# Patient Record
Sex: FEMALE | Race: OTHER | HISPANIC OR LATINO | URBAN - METROPOLITAN AREA
[De-identification: names, ages, dates, MRNs, and addresses within clinical notes are randomized per-mention and may not be internally consistent; named-entity substitution may affect disease eponyms.]

---
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
## 2021-10-20 PROBLEM — Z00.00 ENCOUNTER FOR PREVENTIVE HEALTH EXAMINATION: Status: ACTIVE | Noted: 2021-10-20

## 2023-03-24 ENCOUNTER — EMERGENCY (EMERGENCY)
Facility: HOSPITAL | Age: 23
LOS: 1 days | Discharge: HOME | End: 2023-03-24
Admitting: STUDENT IN AN ORGANIZED HEALTH CARE EDUCATION/TRAINING PROGRAM
Payer: COMMERCIAL

## 2023-03-24 VITALS
HEART RATE: 87 BPM | TEMPERATURE: 98 F | SYSTOLIC BLOOD PRESSURE: 119 MMHG | RESPIRATION RATE: 20 BRPM | DIASTOLIC BLOOD PRESSURE: 81 MMHG | OXYGEN SATURATION: 100 %

## 2023-03-24 VITALS
HEART RATE: 75 BPM | OXYGEN SATURATION: 100 % | RESPIRATION RATE: 16 BRPM | DIASTOLIC BLOOD PRESSURE: 75 MMHG | SYSTOLIC BLOOD PRESSURE: 112 MMHG

## 2023-03-24 LAB
ALBUMIN SERPL ELPH-MCNC: 4.9 G/DL — SIGNIFICANT CHANGE UP (ref 3.3–5)
ALP SERPL-CCNC: 83 U/L — SIGNIFICANT CHANGE UP (ref 40–120)
ALT FLD-CCNC: 13 U/L — SIGNIFICANT CHANGE UP (ref 4–33)
ANION GAP SERPL CALC-SCNC: 11 MMOL/L — SIGNIFICANT CHANGE UP (ref 7–14)
AST SERPL-CCNC: 32 U/L — SIGNIFICANT CHANGE UP (ref 4–32)
BASOPHILS # BLD AUTO: 0.02 K/UL — SIGNIFICANT CHANGE UP (ref 0–0.2)
BASOPHILS NFR BLD AUTO: 0.3 % — SIGNIFICANT CHANGE UP (ref 0–2)
BILIRUB SERPL-MCNC: 0.5 MG/DL — SIGNIFICANT CHANGE UP (ref 0.2–1.2)
BUN SERPL-MCNC: 12 MG/DL — SIGNIFICANT CHANGE UP (ref 7–23)
CALCIUM SERPL-MCNC: 9.7 MG/DL — SIGNIFICANT CHANGE UP (ref 8.4–10.5)
CHLORIDE SERPL-SCNC: 105 MMOL/L — SIGNIFICANT CHANGE UP (ref 98–107)
CO2 SERPL-SCNC: 20 MMOL/L — LOW (ref 22–31)
CREAT SERPL-MCNC: 0.46 MG/DL — LOW (ref 0.5–1.3)
D DIMER BLD IA.RAPID-MCNC: <150 NG/ML DDU — SIGNIFICANT CHANGE UP
EGFR: 139 ML/MIN/1.73M2 — SIGNIFICANT CHANGE UP
EOSINOPHIL # BLD AUTO: 0.02 K/UL — SIGNIFICANT CHANGE UP (ref 0–0.5)
EOSINOPHIL NFR BLD AUTO: 0.3 % — SIGNIFICANT CHANGE UP (ref 0–6)
GLUCOSE SERPL-MCNC: 93 MG/DL — SIGNIFICANT CHANGE UP (ref 70–99)
HCT VFR BLD CALC: 40.2 % — SIGNIFICANT CHANGE UP (ref 34.5–45)
HGB BLD-MCNC: 13.5 G/DL — SIGNIFICANT CHANGE UP (ref 11.5–15.5)
IANC: 5.2 K/UL — SIGNIFICANT CHANGE UP (ref 1.8–7.4)
IMM GRANULOCYTES NFR BLD AUTO: 0.3 % — SIGNIFICANT CHANGE UP (ref 0–0.9)
LYMPHOCYTES # BLD AUTO: 1.77 K/UL — SIGNIFICANT CHANGE UP (ref 1–3.3)
LYMPHOCYTES # BLD AUTO: 24 % — SIGNIFICANT CHANGE UP (ref 13–44)
MCHC RBC-ENTMCNC: 28.6 PG — SIGNIFICANT CHANGE UP (ref 27–34)
MCHC RBC-ENTMCNC: 33.6 GM/DL — SIGNIFICANT CHANGE UP (ref 32–36)
MCV RBC AUTO: 85.2 FL — SIGNIFICANT CHANGE UP (ref 80–100)
MONOCYTES # BLD AUTO: 0.34 K/UL — SIGNIFICANT CHANGE UP (ref 0–0.9)
MONOCYTES NFR BLD AUTO: 4.6 % — SIGNIFICANT CHANGE UP (ref 2–14)
NEUTROPHILS # BLD AUTO: 5.2 K/UL — SIGNIFICANT CHANGE UP (ref 1.8–7.4)
NEUTROPHILS NFR BLD AUTO: 70.5 % — SIGNIFICANT CHANGE UP (ref 43–77)
NRBC # BLD: 0 /100 WBCS — SIGNIFICANT CHANGE UP (ref 0–0)
NRBC # FLD: 0 K/UL — SIGNIFICANT CHANGE UP (ref 0–0)
PLATELET # BLD AUTO: 182 K/UL — SIGNIFICANT CHANGE UP (ref 150–400)
POTASSIUM SERPL-MCNC: 5.1 MMOL/L — SIGNIFICANT CHANGE UP (ref 3.5–5.3)
POTASSIUM SERPL-SCNC: 5.1 MMOL/L — SIGNIFICANT CHANGE UP (ref 3.5–5.3)
PROT SERPL-MCNC: 7.6 G/DL — SIGNIFICANT CHANGE UP (ref 6–8.3)
RBC # BLD: 4.72 M/UL — SIGNIFICANT CHANGE UP (ref 3.8–5.2)
RBC # FLD: 12.2 % — SIGNIFICANT CHANGE UP (ref 10.3–14.5)
SODIUM SERPL-SCNC: 136 MMOL/L — SIGNIFICANT CHANGE UP (ref 135–145)
TROPONIN T, HIGH SENSITIVITY RESULT: <6 NG/L — SIGNIFICANT CHANGE UP
WBC # BLD: 7.37 K/UL — SIGNIFICANT CHANGE UP (ref 3.8–10.5)
WBC # FLD AUTO: 7.37 K/UL — SIGNIFICANT CHANGE UP (ref 3.8–10.5)

## 2023-03-24 PROCEDURE — 71046 X-RAY EXAM CHEST 2 VIEWS: CPT | Mod: 26

## 2023-03-24 PROCEDURE — 99285 EMERGENCY DEPT VISIT HI MDM: CPT

## 2023-03-24 RX ORDER — KETOROLAC TROMETHAMINE 30 MG/ML
30 SYRINGE (ML) INJECTION ONCE
Refills: 0 | Status: DISCONTINUED | OUTPATIENT
Start: 2023-03-24 | End: 2023-03-24

## 2023-03-24 RX ORDER — LIDOCAINE 4 G/100G
1 CREAM TOPICAL ONCE
Refills: 0 | Status: COMPLETED | OUTPATIENT
Start: 2023-03-24 | End: 2023-03-24

## 2023-03-24 RX ORDER — CYCLOBENZAPRINE HYDROCHLORIDE 10 MG/1
1 TABLET, FILM COATED ORAL
Qty: 9 | Refills: 0
Start: 2023-03-24 | End: 2023-03-26

## 2023-03-24 RX ORDER — CYCLOBENZAPRINE HYDROCHLORIDE 10 MG/1
5 TABLET, FILM COATED ORAL ONCE
Refills: 0 | Status: COMPLETED | OUTPATIENT
Start: 2023-03-24 | End: 2023-03-24

## 2023-03-24 RX ORDER — ACETAMINOPHEN 500 MG
650 TABLET ORAL ONCE
Refills: 0 | Status: COMPLETED | OUTPATIENT
Start: 2023-03-24 | End: 2023-03-24

## 2023-03-24 RX ADMIN — Medication 650 MILLIGRAM(S): at 13:30

## 2023-03-24 RX ADMIN — Medication 30 MILLIGRAM(S): at 12:29

## 2023-03-24 RX ADMIN — Medication 30 MILLIGRAM(S): at 12:45

## 2023-03-24 RX ADMIN — Medication 650 MILLIGRAM(S): at 12:30

## 2023-03-24 RX ADMIN — LIDOCAINE 1 PATCH: 4 CREAM TOPICAL at 12:29

## 2023-03-24 RX ADMIN — CYCLOBENZAPRINE HYDROCHLORIDE 5 MILLIGRAM(S): 10 TABLET, FILM COATED ORAL at 12:30

## 2023-03-24 NOTE — ED PROVIDER NOTE - PATIENT PORTAL LINK FT
You can access the FollowMyHealth Patient Portal offered by [facility] by registering at the following website: [email or web address]. By joining [employer]’s FollowMyHealth portal, you will also be able to view your health information using other applications (apps) compatible with our system.

## 2023-03-24 NOTE — ED PROVIDER NOTE - PROGRESS NOTE DETAILS
PA FINE:  Labs nonactionable.  Pt reports feeling better. Pt medically stable for discharge.  Strict return precautions given.  Pt to follow up with PMD.

## 2023-03-24 NOTE — ED ADULT NURSE NOTE - OBJECTIVE STATEMENT
Pt presents to ED ambulatory with steady gait c/o lower back pain and diaphragmatic pain. States she had lower back pain for about 1 week. 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 Pain is relieved when she bends over. States she is unable to take a deep breath without the pain getting worse. Denies any other medical complaints.

## 2023-03-24 NOTE — ED PROVIDER NOTE - OBJECTIVE STATEMENT
Patient is a 22-year-old female with past medical history of hypothyroidism, hypertension presents with back pain x2 weeks and chest pain today.  Patient reports 2 weeks ago, while cleaning, she developed mild, thoracic back tightness, which would worsen with positional change.  Patient reports today, after placing a cake in the refrigerator, she developed intermittent tightness at the sternal region of her chest, which is pleuritic and worse with twisting and movement.  Patient reports she felt mildly short of breath as well.  Patient denies any fevers, chills, nausea, vomiting, neck/jaw/arm/abdominal pain, calf pain/swelling, history of DVT PE, exogenous hormone use, OCP use, history of malignancy, recent surgeries, hemoptysis, illicit drug use, alcohol abuse, sudden death at a young age in her family, early onset CAD in her family, trauma, falls.

## 2023-03-24 NOTE — ED ADULT TRIAGE NOTE - CHIEF COMPLAINT QUOTE
Pt c/o midsternal chest pain and shortness of breath starting today after lifting a cake. Pt also c/o sharp upper back pain x 2 weeks. SpO2 100% on RA.

## 2023-03-24 NOTE — ED ADULT NURSE REASSESSMENT NOTE - NS ED NURSE REASSESS COMMENT FT1
Pt received A&Ox4, ambulatory. Respirations even and unlabored, VSS. Pt reports some improvement in pain. Updated on plan of care. Awaiting x-ray

## 2023-03-24 NOTE — ED PROVIDER NOTE - CLINICAL SUMMARY MEDICAL DECISION MAKING FREE TEXT BOX
Patient is a 22-year-old female with past medical history of hypothyroidism, hypertension presents with back pain x2 weeks and chest pain today.  Patient reports 2 weeks ago, while cleaning, she developed mild, thoracic back tightness, which would worsen with positional change.  Patient reports today, after placing a cake in the refrigerator, she developed intermittent tightness at the sternal region of her chest, which is pleuritic and worse with twisting and movement.  Patient reports she felt mildly short of breath as well.  Patient denies any fevers, chills, nausea, vomiting, neck/jaw/arm/abdominal pain, calf pain/swelling, history of DVT PE, exogenous hormone use, OCP use, history of malignancy, recent surgeries, hemoptysis, illicit drug use, alcohol abuse, sudden death at a young age in her family, early onset CAD in her family, trauma, falls.  This is a patient with likely musculoskeletal chest and back pain; very low clinical suspicion for disease processes including but not limited to tension pneumothorax, aortic dissection, ruptured AAA, esophageal rupture, spinal fracture, spinal cord compression, vertebral osteomyelitis, discitis, spinal epidural abscess.  Will order labs, troponin, D-dimer, EKG, chest x-ray, pain medication.  Disposition pending work-up.

## 2023-03-24 NOTE — ED PROVIDER NOTE - PHYSICAL EXAMINATION
Bilateral UE and LE:  5/5 strength; sensation intact to light touch; < 2 sec cap refill; 2+ pulses; no swelling; no palpable cords; no calf tenderness

## 2023-03-24 NOTE — ED PROVIDER NOTE - NSFOLLOWUPINSTRUCTIONS_ED_ALL_ED_FT
Advance activity as tolerated.  Continue all previously prescribed medications as directed unless otherwise instructed.  Take Tylenol 650mg (Two 325 mg pills) every 4-6 hours as needed for pain or fevers. Take Motrin (also sold as Advil or Ibuprofen) 400-600 mg (two or three 200 mg over the counter pills) every 8 hours as needed for moderate pain or fevers-- take with food; do not take for more than 5 consecutive days.  Take Flexeril 5 mg every 8 hours as needed for muscle spasm -- causes drowsiness; no drinking alcohol or driving with this medication.  Apply lidocaine patch to affected area; this is available over the counter, follow instructions on its packaging.  Follow up with your primary care physician in 48-72 hours- bring copies of your results.  Return to the ER for worsening or persistent symptoms, including but not limited to worsening/persistent pain, numbness, weakness, difficulty urinating, difficulty passing bowel movements, incontinence, difficulty walking, falls, abdominal pain, chest pain, fevers, shortness of breath, palpitations, lightheadedness, and/or ANY NEW OR CONCERNING SYMPTOMS. If you have issues obtaining follow up, please call: [phone] to obtain a doctor or specialist who takes your insurance in your area.  You may call [phone] to make an appointment with the internal medicine clinic.

## 2023-03-26 PROBLEM — E05.90 THYROTOXICOSIS, UNSPECIFIED WITHOUT THYROTOXIC CRISIS OR STORM: Chronic | Status: ACTIVE | Noted: 2019-11-26

## 2023-05-15 NOTE — ED PROVIDER NOTE - SKIN COLOR
Patient presents today for routine prenatal care. Pt denies any vaginal leaking bleeding or contractions. + Fetal movement. She has been having contractions- she's had dizziness, nausea, weak, shaking for 2 days now. Sleeps and wakes up shaking. Confused too. + hives to trunk and back

## 2024-03-29 ENCOUNTER — EMERGENCY (EMERGENCY)
Facility: HOSPITAL | Age: 24
LOS: 0 days | Discharge: HOME | End: 2024-03-29
Attending: EMERGENCY MEDICINE
Payer: COMMERCIAL

## 2024-03-29 VITALS
TEMPERATURE: 99 F | HEIGHT: 64 IN | RESPIRATION RATE: 16 BRPM | OXYGEN SATURATION: 99 % | DIASTOLIC BLOOD PRESSURE: 76 MMHG | SYSTOLIC BLOOD PRESSURE: 119 MMHG | HEART RATE: 67 BPM | WEIGHT: 162.04 LBS

## 2024-03-29 VITALS
OXYGEN SATURATION: 99 % | DIASTOLIC BLOOD PRESSURE: 74 MMHG | TEMPERATURE: 98 F | SYSTOLIC BLOOD PRESSURE: 111 MMHG | HEART RATE: 65 BPM | RESPIRATION RATE: 18 BRPM

## 2024-03-29 DIAGNOSIS — S61.233A PUNCTURE WOUND WITHOUT FOREIGN BODY OF LEFT MIDDLE FINGER WITHOUT DAMAGE TO NAIL, INITIAL ENCOUNTER: ICD-10-CM

## 2024-03-29 DIAGNOSIS — Z29.14 ENCOUNTER FOR PROPHYLACTIC RABIES IMMUNE GLOBIN: ICD-10-CM
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
## 2024-03-29 DIAGNOSIS — S61.432A PUNCTURE WOUND WITHOUT FOREIGN BODY OF LEFT HAND, INITIAL ENCOUNTER: ICD-10-CM

## 2024-03-29 DIAGNOSIS — Z23 ENCOUNTER FOR IMMUNIZATION: ICD-10-CM

## 2024-03-29 DIAGNOSIS — Z20.3 CONTACT WITH AND (SUSPECTED) EXPOSURE TO RABIES: ICD-10-CM

## 2024-03-29 DIAGNOSIS — S61.231A PUNCTURE WOUND WITHOUT FOREIGN BODY OF LEFT INDEX FINGER WITHOUT DAMAGE TO NAIL, INITIAL ENCOUNTER: ICD-10-CM

## 2024-03-29 DIAGNOSIS — S61.232A PUNCTURE WOUND WITHOUT FOREIGN BODY OF RIGHT MIDDLE FINGER WITHOUT DAMAGE TO NAIL, INITIAL ENCOUNTER: ICD-10-CM

## 2024-03-29 DIAGNOSIS — S61.234A PUNCTURE WOUND WITHOUT FOREIGN BODY OF RIGHT RING FINGER WITHOUT DAMAGE TO NAIL, INITIAL ENCOUNTER: ICD-10-CM
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
## 2024-03-29 DIAGNOSIS — S61.235A PUNCTURE WOUND WITHOUT FOREIGN BODY OF LEFT RING FINGER WITHOUT DAMAGE TO NAIL, INITIAL ENCOUNTER: ICD-10-CM

## 2024-03-29 PROCEDURE — 99284 EMERGENCY DEPT VISIT MOD MDM: CPT

## 2024-03-29 RX ORDER — HUMAN RABIES VIRUS IMMUNE GLOBULIN 150 [IU]/ML
1450 INJECTION, SOLUTION INTRAMUSCULAR ONCE
Refills: 0 | Status: COMPLETED | OUTPATIENT
Start: 2024-03-29 | End: 2024-03-29

## 2024-03-29 RX ORDER — RABIES VACC, HUMAN DIPLOID/PF 2.5 UNIT
1 VIAL (EA) INTRAMUSCULAR ONCE
Refills: 0 | Status: COMPLETED | OUTPATIENT
Start: 2024-03-29 | End: 2024-03-29

## 2024-03-29 RX ADMIN — HUMAN RABIES VIRUS IMMUNE GLOBULIN 1450 UNIT(S): 150 INJECTION, SOLUTION INTRAMUSCULAR at 11:46

## 2024-03-29 RX ADMIN — Medication 1 MILLILITER(S): at 10:49

## 2024-03-29 NOTE — ED ADULT NURSE NOTE - HISTORY OF COVID-19 VACCINATION
Patient called stating that she spoke with KAYLEIGH Patel regarding scheduling a lung biopsy and was told they were very busy. She does not want to miss her appointment but does not know when it is scheduled. Her number is [phone], please call her with the appointment information. Yes

## 2024-03-29 NOTE — ED PROVIDER NOTE - NEUROLOGICAL, MLM
Alert and oriented, x 3, clear speech, normal gait, normal sensation and active movement of both hands w/o eliciting any pain

## 2024-03-29 NOTE — ED PROVIDER NOTE - CLINICAL SUMMARY MEDICAL DECISION MAKING FREE TEXT BOX
23F referred from [facility] for rabies vaccination  -initiate rabies vaccination and ig  -wounds appear to be well healing and not infected and pt not in pain therefore no other treatment is indicated

## 2024-03-29 NOTE — ED PROVIDER NOTE - NSFOLLOWUPINSTRUCTIONS_ED_ALL_ED_FT
You received the rabies vaccine and immunoglobulin today. You will need to complete the rabies vaccine series, therefore you need another rabies vaccine on 4/2/24, 4/6/24, and 4/13/24. Please return to the ER those dates.

## 2024-03-29 NOTE — ED ADULT TRIAGE NOTE - CHIEF COMPLAINT QUOTE
Dog bites 2 weeks ago to b/l fingers, requesting Rabies shot. Denies pains and added that sites are now healed.

## 2024-03-29 NOTE — ED PROVIDER NOTE - CARE PLAN
Principal Discharge DX:	Dog bite of multiple sites  Secondary Diagnosis:	Need for prophylactic vaccination against rabies   1

## 2024-03-29 NOTE — ED ADULT NURSE NOTE - OBJECTIVE STATEMENT
Patient s/p dog bite, 2 weeks ago and requesting rabies vaccine after being contacted by health department that dog's owner has stopped cooperating with health department. Wound healed. Patient denies fevers/chills. Patient s/p dog bite, 2 weeks ago and requesting rabies vaccine after being contacted by health department that dog's owner has stopped cooperating with health department. Date of dog bite, 3/9/24. Wounds healed. Patient denies fevers/chills.

## 2024-03-29 NOTE — ED PROVIDER NOTE - PATIENT PORTAL LINK FT
You can access the FollowMyHealth Patient Portal offered by [facility] by registering at the following website: [email or web address]. By joining [employer]’s FollowMyHealth portal, you will also be able to view your health information using other applications (apps) compatible with our system.

## 2024-03-29 NOTE — ED PROVIDER NOTE - SKIN, MLM
Skin normal color for race, warm, dry and intact. No evidence of rash. There are healing puncture wounds on the palmar surface of left fingers 2-4 and right fingers 3-4. There is a healing wound on the right forearm.

## 2024-04-02 ENCOUNTER — EMERGENCY (EMERGENCY)
Facility: HOSPITAL | Age: 24
LOS: 0 days | Discharge: HOME | End: 2024-04-02
Attending: EMERGENCY MEDICINE
Payer: SELF-PAY

## 2024-04-02 VITALS
TEMPERATURE: 99 F | DIASTOLIC BLOOD PRESSURE: 72 MMHG | WEIGHT: 162.92 LBS | HEART RATE: 70 BPM | RESPIRATION RATE: 15 BRPM | OXYGEN SATURATION: 99 % | HEIGHT: 64 IN | SYSTOLIC BLOOD PRESSURE: 109 MMHG

## 2024-04-02 DIAGNOSIS — S51.851D OPEN BITE OF RIGHT FOREARM, SUBSEQUENT ENCOUNTER: ICD-10-CM

## 2024-04-02 DIAGNOSIS — Z23 ENCOUNTER FOR IMMUNIZATION: ICD-10-CM

## 2024-04-02 DIAGNOSIS — Z20.3 CONTACT WITH AND (SUSPECTED) EXPOSURE TO RABIES: ICD-10-CM

## 2024-04-02 PROCEDURE — L9995: CPT

## 2024-04-02 RX ORDER — RABIES VACC, HUMAN DIPLOID/PF 2.5 UNIT
1 VIAL (EA) INTRAMUSCULAR ONCE
Refills: 0 | Status: COMPLETED | OUTPATIENT
Start: 2024-04-02 | End: 2024-04-02

## 2024-04-02 RX ADMIN — Medication 1 MILLILITER(S): at 09:50

## 2024-04-02 NOTE — ED ADULT NURSE NOTE - OBJECTIVE STATEMENT
patient alert and oriented x4, came in for rabies follow up. pt states she was bit by a dog, denies any pain or discomfort at this time. denies fevers, chills, shortness of breath, chest pain. pt is here to receive her second rabies vaccination. pmh of hyperthyroidism.

## 2024-04-02 NOTE — ED PROVIDER NOTE - CLINICAL SUMMARY MEDICAL DECISION MAKING FREE TEXT BOX
23F referred from [facility] for rabies vaccination  -give 2nd rabies vaccine  -wounds appear to be well healing and not infected and pt not in pain therefore no other treatment is indicated

## 2024-04-02 NOTE — ED PROVIDER NOTE - PATIENT PORTAL LINK FT
You can access the FollowMyHealth Patient Portal offered by [facility] by registering at the following website: [email or web address]. By joining [employer]’s FollowMyHealth portal, you will also be able to view your health information using other applications (apps) compatible with our system.

## 2024-04-02 NOTE — ED ADULT NURSE NOTE - HOW OFTEN DO YOU HAVE A DRINK CONTAINING ALCOHOL?
CONTINUE ON YOUR VGO FOR NOW.   GIVE 4 TO 5 CLICKS WITH MEALS.     SWITCH TO OMNIPOD DASH -   MEET WITH THE DIABETES EDUCATOR TO TRAIN/SHOW YOU HOW TO USE YOUR NEW PUMP.   THE OMNIPOD IS GOING TO GIVE YOU MORE GLUCOSE CONTROL INSTEAD OF THE VGO PUMP.     For low blood sugar - remember to keep glucose tablets on hand. You can purchase these at the pharmacy check out desk/over the counter.   If blood sugar is less than 70, take/eat 2 - 3 tablets quickly to bring your sugar up.   Always keep 15 grams of Quick acting carbohydrates on hand to eat/drink if your sugar is low - examples are 1/2 cup of juice, coke, or crackers, granola bars.   Remember to eat meals frequently to prevent low blood blood sugars.      FOR BACK UP INSULIN - IN THE CASE OF RUNNING OUT OF YOUR VGO OR RUNNING OUT OF YOUR OMNIPOD --->     
Never

## 2024-04-02 NOTE — ED PROVIDER NOTE - NSFOLLOWUPINSTRUCTIONS_ED_ALL_ED_FT
You received the rabies vaccine today. You will need to complete the rabies vaccine series, therefore you need another rabies vaccine on 4/6/24 and 4/13/24. Please return to the ER those dates. Return to the ER if you develop fever, confusion, seizures, inability to swallow, severe pain, passing out, or any other concerning symptoms

## 2024-04-02 NOTE — ED PROVIDER NOTE - CARE PLAN
Principal Discharge DX:	Dog bite, subsequent encounter  Secondary Diagnosis:	Need for rabies vaccination   1

## 2024-04-02 NOTE — ED PROVIDER NOTE - OBJECTIVE STATEMENT
the patient returns for the second rabies vaccine after she was bitten by a dog and advised by the [facility] to get rabies vaccine series

## 2024-04-06 ENCOUNTER — EMERGENCY (EMERGENCY)
Facility: HOSPITAL | Age: 24
LOS: 0 days | Discharge: HOME | End: 2024-04-06
Attending: EMERGENCY MEDICINE
Payer: SELF-PAY

## 2024-04-06 VITALS
RESPIRATION RATE: 20 BRPM | WEIGHT: 162.04 LBS | HEIGHT: 64 IN | TEMPERATURE: 98 F | DIASTOLIC BLOOD PRESSURE: 63 MMHG | HEART RATE: 69 BPM | OXYGEN SATURATION: 96 % | SYSTOLIC BLOOD PRESSURE: 101 MMHG

## 2024-04-06 DIAGNOSIS — S51.851D OPEN BITE OF RIGHT FOREARM, SUBSEQUENT ENCOUNTER: ICD-10-CM

## 2024-04-06 DIAGNOSIS — S61.452D OPEN BITE OF LEFT HAND, SUBSEQUENT ENCOUNTER: ICD-10-CM

## 2024-04-06 DIAGNOSIS — Z23 ENCOUNTER FOR IMMUNIZATION: ICD-10-CM

## 2024-04-06 DIAGNOSIS — S61.451D OPEN BITE OF RIGHT HAND, SUBSEQUENT ENCOUNTER: ICD-10-CM
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
## 2024-04-06 PROCEDURE — L9995: CPT

## 2024-04-06 RX ORDER — RABIES VACC, HUMAN DIPLOID/PF 2.5 UNIT
1 VIAL (EA) INTRAMUSCULAR ONCE
Refills: 0 | Status: COMPLETED | OUTPATIENT
Start: 2024-04-06 | End: 2024-04-06

## 2024-04-06 RX ADMIN — Medication 1 MILLILITER(S): at 07:49

## 2024-04-06 NOTE — ED PROVIDER NOTE - PATIENT PORTAL LINK FT
You can access the FollowMyHealth Patient Portal offered by [facility] by registering at the following website: [email or web address]. By joining [employer]’s FollowMyHealth portal, you will also be able to view your health information using other applications (apps) compatible with our system.

## 2024-04-06 NOTE — ED PROVIDER NOTE - OBJECTIVE STATEMENT
The patient is here for the third rabies vaccine after she was instructed by the [facility] to get the rabies vaccine and immunoglobulin after a dog bite.

## 2024-04-06 NOTE — ED PROVIDER NOTE - NSFOLLOWUPINSTRUCTIONS_ED_ALL_ED_FT
You received the 3rd rabies vaccine today. You will need to complete the rabies vaccine series, therefore you need another rabies vaccine on 4/13/24. Please return to the ER that day. Return to the ER if you develop fever, confusion, seizures, inability to swallow, severe pain, passing out, or any other concerning symptoms

## 2024-04-06 NOTE — ED PROVIDER NOTE - CLINICAL SUMMARY MEDICAL DECISION MAKING FREE TEXT BOX
23F referred from [facility] for rabies vaccination  -give 3nd rabies vaccine  -wounds appear to be well healing and not infected and pt not in pain therefore no other treatment is indicated at this time

## 2024-04-13 ENCOUNTER — EMERGENCY (EMERGENCY)
Facility: HOSPITAL | Age: 24
LOS: 0 days | Discharge: HOME | End: 2024-04-13
Attending: EMERGENCY MEDICINE
Payer: COMMERCIAL

## 2024-04-13 VITALS
WEIGHT: 175.05 LBS | RESPIRATION RATE: 18 BRPM | HEART RATE: 72 BPM | SYSTOLIC BLOOD PRESSURE: 119 MMHG | HEIGHT: 64 IN | OXYGEN SATURATION: 99 % | DIASTOLIC BLOOD PRESSURE: 75 MMHG | TEMPERATURE: 98 F

## 2024-04-13 VITALS
SYSTOLIC BLOOD PRESSURE: 108 MMHG | RESPIRATION RATE: 19 BRPM | HEART RATE: 88 BPM | TEMPERATURE: 98 F | DIASTOLIC BLOOD PRESSURE: 71 MMHG | OXYGEN SATURATION: 97 %

## 2024-04-13 DIAGNOSIS — S61.402D UNSPECIFIED OPEN WOUND OF LEFT HAND, SUBSEQUENT ENCOUNTER: ICD-10-CM

## 2024-04-13 DIAGNOSIS — S61.401D UNSPECIFIED OPEN WOUND OF RIGHT HAND, SUBSEQUENT ENCOUNTER: ICD-10-CM
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
## 2024-04-13 DIAGNOSIS — Z23 ENCOUNTER FOR IMMUNIZATION: ICD-10-CM

## 2024-04-13 PROCEDURE — L9995: CPT

## 2024-04-13 RX ORDER — RABIES VACC, HUMAN DIPLOID/PF 2.5 UNIT
1 VIAL (EA) INTRAMUSCULAR ONCE
Refills: 0 | Status: COMPLETED | OUTPATIENT
Start: 2024-04-13 | End: 2024-04-13

## 2024-04-13 RX ADMIN — Medication 1 MILLILITER(S): at 07:47

## 2024-04-13 NOTE — ED ADULT NURSE NOTE - CHIEF COMPLAINT QUOTE
pt here for last dose of rabies vaccine.  pt denies any side effects from previous vaccinations, wounds are healing appropriately.  bitten approx 4 wks ago.

## 2024-04-13 NOTE — ED ADULT NURSE NOTE - OBJECTIVE STATEMENT
23 yr old female AOx4. C/o final rabies vaccine fu. No reactions with previous doses. Wounds healing well on hand. No pain or complaints. Dog biteapprox 4 weeks ago. No PMH. Pt denies any pain, CP, SOB, N/V/D, fever/chills, h/a, dizziness.

## 2024-04-13 NOTE — ED PROVIDER NOTE - CONSTITUTIONAL, MLM
normal... Well appearing, awake, alert, oriented to person, place, time/situation and in no apparent distress. Speaking in clear full sentences smiling pleasant appears very comfortable

## 2024-04-13 NOTE — ED PROVIDER NOTE - NSDCPRINTRESULTS_ED_ALL_ED
Size Of Lesion In Cm: 0.5 Patient requests all Lab, Cardiology, and Radiology Results on their Discharge Instructions

## 2024-04-13 NOTE — ED PROVIDER NOTE - OBJECTIVE STATEMENT
23 years old female walked in with a rabies vaccine schedules after a dog bite on March 29, 2024 to the bilateral hands. According to the schedule on the paper which pt brought in today is her 14th day shot the last shot. Pt sts all wounds on bilateral hands and fingers are healed.

## 2024-04-13 NOTE — ED PROVIDER NOTE - PATIENT PORTAL LINK FT
You can access the FollowMyHealth Patient Portal offered by [facility] by registering at the following website: [email or web address]. By joining [employer]’s FollowMyHealth portal, you will also be able to view your health information using other applications (apps) compatible with our system.

## 2024-04-13 NOTE — ED ADULT TRIAGE NOTE - CHIEF COMPLAINT QUOTE
pt here for last dose of rabies vaccine.  pt denies any side effects from previous vaccinations, wounds are healing appropriately. pt here for last dose of rabies vaccine.  pt denies any side effects from previous vaccinations, wounds are healing appropriately.  bitten approx 4 wks ago.

## 2024-04-13 NOTE — ED PROVIDER NOTE - PHYSICAL EXAMINATION
skin- punctured wounds the bilateral hands and fingers are healed no induration no erythema no drainage.

## 2024-04-13 NOTE — ED PROVIDER NOTE - CLINICAL SUMMARY MEDICAL DECISION MAKING FREE TEXT BOX
pt walked in requests day 14 rabies shot due to the dog bite on March 29, 2024. all wounds are healed well. appears very comfortable.

## 2024-04-13 NOTE — ED ADULT NURSE NOTE - SUICIDE SCREENING QUESTION 3
-- DO NOT REPLY / DO NOT REPLY ALL --  -- Message is from [employer] ([employer]) --    General Patient Message: Patient is requesting to be contacted as soon as the forms for the DMV have been completed by Dr. Robert. Patient is asking for the forms to be faxed directly to DMV once its completed. Patient would also like a copy faxed to her at [phone]. Writer unable to further assist.       Alternative phone number: n/a    Can a detailed message be left? Yes    Message Turnaround: [place]:    Refer to site's KB page for routing instructions    Please give this turnaround time to the caller:   \"You can expect to receive a response 1-3 business days after your provider's clinical team reviews the message\"               No

## 2025-02-12 ENCOUNTER — APPOINTMENT (OUTPATIENT)
Dept: OBGYN | Facility: CLINIC | Age: 25
End: 2025-02-12

## 2025-02-12 VITALS
SYSTOLIC BLOOD PRESSURE: 112 MMHG | DIASTOLIC BLOOD PRESSURE: 74 MMHG | BODY MASS INDEX: 27.83 KG/M2 | HEIGHT: 64 IN | WEIGHT: 163 LBS

## 2025-02-12 DIAGNOSIS — Z78.9 OTHER SPECIFIED HEALTH STATUS: ICD-10-CM

## 2025-02-12 DIAGNOSIS — I10 ESSENTIAL (PRIMARY) HYPERTENSION: ICD-10-CM

## 2025-02-12 DIAGNOSIS — Z83.3 FAMILY HISTORY OF DIABETES MELLITUS: ICD-10-CM

## 2025-02-12 DIAGNOSIS — N94.3 PREMENSTRUAL TENSION SYNDROME: ICD-10-CM

## 2025-02-12 DIAGNOSIS — Z86.39 PERSONAL HISTORY OF OTHER ENDOCRINE, NUTRITIONAL AND METABOLIC DISEASE: ICD-10-CM

## 2025-02-12 DIAGNOSIS — Z80.3 FAMILY HISTORY OF MALIGNANT NEOPLASM OF BREAST: ICD-10-CM

## 2025-02-12 DIAGNOSIS — Z11.3 ENCOUNTER FOR SCREENING FOR INFECTIONS WITH A PREDOMINANTLY SEXUAL MODE OF TRANSMISSION: ICD-10-CM

## 2025-02-12 DIAGNOSIS — Z01.411 ENCOUNTER FOR GYNECOLOGICAL EXAMINATION (GENERAL) (ROUTINE) WITH ABNORMAL FINDINGS: ICD-10-CM

## 2025-02-12 DIAGNOSIS — Z82.49 FAMILY HISTORY OF ISCHEMIC HEART DISEASE AND OTHER DISEASES OF THE CIRCULATORY SYSTEM: ICD-10-CM

## 2025-02-12 PROCEDURE — 36415 COLL VENOUS BLD VENIPUNCTURE: CPT

## 2025-02-12 PROCEDURE — 99385 PREV VISIT NEW AGE 18-39: CPT

## 2025-02-12 RX ORDER — NORELGESTROMIN AND ETHINYL ESTRADIOL 150; 35 UG/D; UG/D
150-35 PATCH TRANSDERMAL
Qty: 3 | Refills: 3 | Status: ACTIVE | COMMUNITY
Start: 2025-02-12 | End: 1900-01-01

## 2025-02-15 LAB
C TRACH RRNA SPEC QL NAA+PROBE: NOT DETECTED
HBV SURFACE AG SER QL: NONREACTIVE
HCV AB SER QL: NONREACTIVE
HCV S/CO RATIO: 0.09 S/CO
HIV1+2 AB SPEC QL IA.RAPID: NONREACTIVE
N GONORRHOEA RRNA SPEC QL NAA+PROBE: NOT DETECTED
SOURCE TP AMPLIFICATION: NORMAL
T PALLIDUM AB SER QL IA: NEGATIVE

## 2025-02-18 LAB — CYTOLOGY CVX/VAG DOC THIN PREP: ABNORMAL

## 2025-02-21 PROBLEM — Z83.3 FAMILY HISTORY OF DIABETES MELLITUS: Status: ACTIVE | Noted: 2025-02-12

## 2025-02-21 PROBLEM — Z82.49 FAMILY HISTORY OF HYPERTENSION: Status: ACTIVE | Noted: 2025-02-12

## 2025-02-21 PROBLEM — I10 HYPERTENSION, WELL CONTROLLED: Status: RESOLVED | Noted: 2025-02-12 | Resolved: 2025-02-21

## 2025-02-21 PROBLEM — Z78.9 SOCIAL ALCOHOL USE: Status: ACTIVE | Noted: 2025-02-12

## 2025-02-21 PROBLEM — Z78.9 NON-SMOKER: Status: ACTIVE | Noted: 2025-02-12

## 2025-02-21 PROBLEM — Z80.3 FAMILY HISTORY OF MALIGNANT NEOPLASM OF BREAST: Status: ACTIVE | Noted: 2025-02-12

## 2025-02-21 PROBLEM — Z86.39 HISTORY OF HYPERTHYROIDISM: Status: RESOLVED | Noted: 2025-02-12 | Resolved: 2025-02-21

## 2025-03-24 ENCOUNTER — TRANSCRIPTION ENCOUNTER (OUTPATIENT)
Age: 25
End: 2025-03-24

## 2025-03-24 NOTE — ED PROVIDER NOTE - OBJECTIVE STATEMENT
Pt noted with A1c of 6.1%, within pre-DM range. States it was up to 6.9% at one point, though does not check fingersticks or take any DM medications at home.  Farxiga noted per H&P.
The patient states she was bitten by her neighbors dog in both hands and the R arm 2wk ago. She went to urgent care and was given tetanus shot and wound care and [facility] to initiate rabies vaccine series as the dog that bit her was unable to be tested.

## 2025-04-16 NOTE — ED ADULT TRIAGE NOTE - ESI TRIAGE ACUITY LEVEL, MLM
Medication: Ritalin and Methylphenidate  Last office visit date: 10/16/24  Upcoming OV: 5/16/25  Medication Refill Protocol Failed.  Failed criteria: Controlled substance. Sent to clinician to review.      3

## 2025-06-10 NOTE — ED ADULT NURSE NOTE - NSSEPSISNEWALTERMENTAL_ED_A_ED
Proceed with treatment per orders.   Rv in 2 weeks with cbc, cmp   Need cbc, cmp spep light chains. Immunoglobulin level next week   
No